# Patient Record
Sex: FEMALE | URBAN - METROPOLITAN AREA
[De-identification: names, ages, dates, MRNs, and addresses within clinical notes are randomized per-mention and may not be internally consistent; named-entity substitution may affect disease eponyms.]

---
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
## 2024-05-19 NOTE — PHYSICAL EXAM
89 [Alert] : alert [No Acute Distress] : no acute distress [Atraumatic] : atraumatic [Normocephalic] : normocephalic [EOMI Bilateral] : EOMI bilateral [PERRLA] : JADE [Conjunctivae with no discharge] : conjunctivae with no discharge [No Excess Tearing] : no excess tearing [Clear tympanic membranes with bony landmarks and light reflex present bilaterally] : clear tympanic membranes with bony landmarks and light reflex present bilaterally  [Auditory Canals Clear] : auditory canals clear [Pink Nasal Mucosa] : pink nasal mucosa [Nares Patent] : nares patent [No Discharge] : no discharge [Nonerythematous Oropharynx] : nonerythematous oropharynx [Palate Intact] : palate intact [Supple, full passive range of motion] : supple, full passive range of motion [No Palpable Masses] : no palpable masses [Clear to Auscultation Bilaterally] : clear to auscultation bilaterally [Normoactive Precordium] : normoactive precordium [Regular Rate and Rhythm] : regular rate and rhythm [Normal S1, S2 audible] : normal S1, S2 audible [No Murmurs] : no murmurs [+2 Femoral Pulses] : +2 femoral pulses [Soft] : soft [NonTender] : non tender [Non Distended] : non distended [Normoactive Bowel Sounds] : normoactive bowel sounds [No Hepatomegaly] : no hepatomegaly [No Splenomegaly] : no splenomegaly [No Abnormal Lymph Nodes Palpated] : no abnormal lymph nodes palpated [Normal Muscle Tone] : normal muscle tone [No Gait Asymmetry] : no gait asymmetry [No pain or deformities with palpation of bone, muscles, joints] : no pain or deformities with palpation of bone, muscles, joints [Straight] : straight [No Scoliosis] : no scoliosis [+2 Patella DTR] : +2 patella DTR [Cranial Nerves Grossly Intact] : cranial nerves grossly intact [No Rash or Lesions] : no rash or lesions [de-identified] : Able to duck walk, toe walk, heel walk, tandem walk, single leg hop  [de-identified] : Face: mixed comedones & post-inflammatory hyperpigmentation on forehead; Back: post-inflammatory hyperpigmentation from acne